# Patient Record
Sex: MALE | Race: WHITE | NOT HISPANIC OR LATINO | Employment: FULL TIME | URBAN - METROPOLITAN AREA
[De-identification: names, ages, dates, MRNs, and addresses within clinical notes are randomized per-mention and may not be internally consistent; named-entity substitution may affect disease eponyms.]

---

## 2017-06-05 ENCOUNTER — APPOINTMENT (OUTPATIENT)
Dept: GENERAL RADIOLOGY | Facility: HOSPITAL | Age: 37
End: 2017-06-05

## 2017-06-05 VITALS
WEIGHT: 175 LBS | HEART RATE: 94 BPM | RESPIRATION RATE: 18 BRPM | SYSTOLIC BLOOD PRESSURE: 138 MMHG | HEIGHT: 69 IN | TEMPERATURE: 97.4 F | BODY MASS INDEX: 25.92 KG/M2 | DIASTOLIC BLOOD PRESSURE: 75 MMHG | OXYGEN SATURATION: 97 %

## 2017-06-05 PROCEDURE — 99283 EMERGENCY DEPT VISIT LOW MDM: CPT

## 2017-06-05 PROCEDURE — 73610 X-RAY EXAM OF ANKLE: CPT

## 2017-06-06 ENCOUNTER — HOSPITAL ENCOUNTER (EMERGENCY)
Facility: HOSPITAL | Age: 37
Discharge: HOME | End: 2017-06-06
Attending: EMERGENCY MEDICINE | Admitting: EMERGENCY MEDICINE

## 2017-06-06 DIAGNOSIS — S86.111A GASTROCNEMIUS TEAR, RIGHT, INITIAL ENCOUNTER: Primary | ICD-10-CM

## 2017-06-06 RX ORDER — ONDANSETRON 4 MG/1
4 TABLET, FILM COATED ORAL EVERY 8 HOURS PRN
Qty: 15 TABLET | Refills: 0 | Status: SHIPPED | OUTPATIENT
Start: 2017-06-06

## 2017-06-06 RX ORDER — HYDROCODONE BITARTRATE AND ACETAMINOPHEN 7.5; 325 MG/1; MG/1
1 TABLET ORAL EVERY 6 HOURS PRN
Qty: 15 TABLET | Refills: 0 | OUTPATIENT
Start: 2017-06-06 | End: 2021-08-23

## 2017-06-06 RX ORDER — ONDANSETRON 4 MG/1
4 TABLET, ORALLY DISINTEGRATING ORAL ONCE
Status: COMPLETED | OUTPATIENT
Start: 2017-06-06 | End: 2017-06-06

## 2017-06-06 RX ORDER — HYDROCODONE BITARTRATE AND ACETAMINOPHEN 7.5; 325 MG/1; MG/1
1 TABLET ORAL ONCE
Status: COMPLETED | OUTPATIENT
Start: 2017-06-06 | End: 2017-06-06

## 2017-06-06 RX ADMIN — ONDANSETRON 4 MG: 4 TABLET, ORALLY DISINTEGRATING ORAL at 02:52

## 2017-06-06 RX ADMIN — HYDROCODONE BITARTRATE AND ACETAMINOPHEN 1 TABLET: 7.5; 325 TABLET ORAL at 02:52

## 2017-06-06 NOTE — ED PROVIDER NOTES
Pt presents to the ED c/o right calf pain that began tonight. Pt states that he was running while playing football when he felt a pop in the calf. On exam, Pt is resting comfortably, in no distress, and without focal neurologic deficit. [place] test is negative. Comparments soft, pulse palpable. No contusion or swelling. Marked tenderness to the lower calf and achilles tendon, though tendon appears to be intact. Pt is able to dorsiflex the right foot, though with exquisite pain. I agree with the plan for a posterior splint and discharge w/ ortho f/u.     Attestation:  I supervised care provided by the midlevel provider.    We have discussed this patient's history, physical exam, and treatment plan.    I have reviewed the note and personally saw and examined the patient and agree with the plan of care.  I agree with the midlevel provider's findings and plan. I reviewed the midlevel providers note.     Documentation assistance provided by eric Vega for Dr Andre. Information recorded by the ozibzain was done at my direction and has been verified and validated by me.     Jahaira Vega  06/06/17 0224       Ronald Andre MD  06/06/17 3148

## 2017-06-06 NOTE — ED PROVIDER NOTES
" EMERGENCY DEPARTMENT ENCOUNTER    CHIEF COMPLAINT  Chief Complaint: lower extremity issue   History given by: patient  History limited by: nothing  Room Number: 01/01  PMD: No Known Provider      HPI:  Pt is a 36 y.o. male who presents complaining of R posterior calf pain after he heard a \"pop\" while running during a pick-up football game around 2000 last night. Pt believes he \"ruptured his achilles\". Pt denies any similar pain or episode in the past.     Duration:  6 hours  Onset: sudden  Timing: constant  Location: R posterior calf  Radiation: none  Quality: \"I think I ruptured my achilles\"  Intensity/Severity: moderate  Progression: unchanged  Associated Symptoms: none  Aggravating Factors: movement  Alleviating Factors: none  Previous Episodes: none  Treatment before arrival: none    PAST MEDICAL HISTORY  Active Ambulatory Problems     Diagnosis Date Noted   • No Active Ambulatory Problems     Resolved Ambulatory Problems     Diagnosis Date Noted   • No Resolved Ambulatory Problems     No Additional Past Medical History       PAST SURGICAL HISTORY  History reviewed. No pertinent surgical history.    FAMILY HISTORY  History reviewed. No pertinent family history.    SOCIAL HISTORY  Social History     Social History   • Marital status: Single     Spouse name: N/A   • Number of children: N/A   • Years of education: N/A     Occupational History   • Not on file.     Social History Main Topics   • Smoking status: Current Every Day Smoker   • Smokeless tobacco: Not on file   • Alcohol use No   • Drug use: No   • Sexual activity: Not on file     Other Topics Concern   • Not on file     Social History Narrative   • No narrative on file       ALLERGIES  Nuts    REVIEW OF SYSTEMS  Review of Systems   Respiratory: Negative for shortness of breath.    Cardiovascular: Negative for chest pain.   Musculoskeletal:        R posterior calf pain   Neurological: Negative for numbness.   All other systems reviewed and are " negative.      PHYSICAL EXAM  ED Triage Vitals   Temp Heart Rate Resp BP SpO2   06/05/17 2255 06/05/17 2255 06/05/17 2255 06/05/17 2310 06/05/17 2255   97.4 °F (36.3 °C) 94 18 138/75 97 %      Temp src Heart Rate Source Patient Position BP Location FiO2 (%)   06/05/17 2255 06/05/17 2255 -- -- --   Tympanic Monitor          Physical Exam   Constitutional: He is oriented to person, place, and time and well-developed, well-nourished, and in no distress.   Eyes: EOM are normal.   Neck: Normal range of motion.   Cardiovascular: Normal rate and regular rhythm.    Pulmonary/Chest: Effort normal and breath sounds normal. No respiratory distress.   Musculoskeletal:   R ANKLE: achilles tendon is intact distally. Severe R calf tenderness. NegativeThompson test.   Neurological: He is alert and oriented to person, place, and time. He has normal sensation and normal strength.   Skin: Skin is warm and dry.   Psychiatric: Affect normal.   Nursing note and vitals reviewed.    RADIOLOGY  XR Ankle 3+ View Right   FINDINGS: There is some effacement of the fat planes and soft tissue  thickening in the region of the Achilles tendon and likely related to  partial or complete disruption of the Achilles tendon and clinical  correlation to this area is recommended.     There is also some localized cortical irregularity and bony protrusion  involving the dorsal margin of the anterior talus which may relate to  old trauma but clinical correlation to this area is recommended.     This report was finalized on 6/6/2017 12:16 AM by Dr. Garry Kaufman MD.           I ordered the above noted radiological studies. Interpreted by radiologist. Discussed with radiologist. Reviewed by me in PACS.       PROCEDURES   Splint Application  Date/Time: 6/6/2017 2:23 AM  Performed by: MARIEL LUKE  Authorized by: JASSI PHILLIPS   Consent: Verbal consent obtained.  Risks and benefits: risks, benefits and alternatives were discussed  Consent given by:  "patient  Location details: right ankle  Splint type: short leg posterior.  Supplies used: Ortho-Glass  Post-procedure: The splinted body part was neurovascularly unchanged following the procedure.  Patient tolerance: Patient tolerated the procedure well with no immediate complications            PROGRESS AND CONSULTS  ED Course     2:15 AM: Ordered norco/zofran for pain management    2:18 AM:  Reviewed pt's history and workup with Dr. Andre, who, after bedside evaluation, agrees with the plan of care.    2:25 AM: Posterior short leg splint placed. D/w pt plan for discharge with Norco and Zofran. Pt to f/u with orthopedist. Pt understands and agrees with the plan, all questions answered.     MEDICAL DECISION MAKING  Results were reviewed/discussed with the patient and they were also made aware of online access. Pt also made aware that some labs, such as cultures, will not be resulted during ER visit and follow up with PMD is necessary.     MDM  Number of Diagnoses or Management Options  Gastrocnemius tear, right, initial encounter:      Amount and/or Complexity of Data Reviewed  Tests in the radiology section of CPT®: reviewed and ordered (XR R ankle shows \"some effacement of the fat planes and soft tissue thickening in the region of the Achilles tendon and likely related to partial or complete disruption of the Achilles tendon and clinical correlation to this area is recommended.\")  Discuss the patient with other providers: yes (D/w Dr. Andre)           DIAGNOSIS  Final diagnoses:   Gastrocnemius tear, right, initial encounter       DISPOSITION  DISCHARGE    Patient discharged in stable condition.    Reviewed implications of results, diagnosis, meds, responsibility to follow up, warning signs and symptoms of possible worsening, potential complications and reasons to return to ER.    Patient/Family voiced understanding of above instructions.    Discussed plan for discharge, as there is no emergent indication for " admission.  Pt/family is agreeable and understands need for follow up and repeat testing.  Pt is aware that discharge does not mean that nothing is wrong but it indicates no emergency is present that requires admission and they must continue care with follow-up as given below or physician of their choice.     FOLLOW-UP  Israel Herrera MD  [address] [facility] [address]  [place] [id number] [phone]    Schedule an appointment as soon as possible for a visit           Medication List      New Prescriptions          HYDROcodone-acetaminophen 7.5-325 MG per tablet   Commonly known as:  NORCO   Take 1 tablet by mouth Every 6 (Six) Hours As Needed for Severe Pain   (7-10).       ondansetron 4 MG tablet   Commonly known as:  ZOFRAN   Take 1 tablet by mouth Every 8 (Eight) Hours As Needed for Nausea or   Vomiting.             Latest Documented Vital Signs:  As of 2:26 AM  BP- 138/75 HR- 94 Temp- 97.4 °F (36.3 °C) (Tympanic) O2 sat- 97%    --  Documentation assistance provided by eric Tapia for Rj Mccormack PA-C.  Information recorded by the scribe was done at my direction and has been verified and validated by me.     Khanh Tapia  06/06/17 0227       KAROLYN Abdi  06/06/17 [id number]